# Patient Record
Sex: FEMALE | Race: WHITE | Employment: FULL TIME | URBAN - METROPOLITAN AREA
[De-identification: names, ages, dates, MRNs, and addresses within clinical notes are randomized per-mention and may not be internally consistent; named-entity substitution may affect disease eponyms.]

---

## 2023-01-04 ENCOUNTER — OFFICE VISIT (OUTPATIENT)
Dept: UROGYNECOLOGY | Age: 58
End: 2023-01-04
Payer: COMMERCIAL

## 2023-01-04 VITALS
DIASTOLIC BLOOD PRESSURE: 82 MMHG | HEART RATE: 69 BPM | SYSTOLIC BLOOD PRESSURE: 123 MMHG | TEMPERATURE: 97.8 F | RESPIRATION RATE: 17 BRPM | OXYGEN SATURATION: 97 %

## 2023-01-04 DIAGNOSIS — N39.3 STRESS INCONTINENCE: Primary | ICD-10-CM

## 2023-01-04 DIAGNOSIS — N81.11 CYSTOCELE, MIDLINE: ICD-10-CM

## 2023-01-04 LAB
BILIRUBIN, POC: NORMAL
BLOOD URINE, POC: NORMAL
CLARITY, POC: CLEAR
COLOR, POC: YELLOW
EMPTY COUGH STRESS TEST: NORMAL
FIRST SENSATION: 140 CC
FULL COUGH STRESS TEST: NORMAL
GLUCOSE URINE, POC: NORMAL
KETONES, POC: NORMAL
LEUKOCYTE EST, POC: NORMAL
MAX SENSATION: 180 CC
NITRATE, URINE POC: NORMAL
NITRITE, POC: NORMAL
PH, POC: 6.5
POST VOID RESIDUAL (PVR): 150 ML
PROTEIN, POC: NORMAL
RBC URINE, POC: NORMAL
SECOND SENSATION: 150 CC
SPASM: NORMAL
SPECIFIC GRAVITY, POC: 1.01
UROBILINOGEN, POC: NORMAL
WBC URINE, POC: NORMAL

## 2023-01-04 PROCEDURE — 51725 SIMPLE CYSTOMETROGRAM: CPT | Performed by: OBSTETRICS & GYNECOLOGY

## 2023-01-04 PROCEDURE — 81002 URINALYSIS NONAUTO W/O SCOPE: CPT | Performed by: OBSTETRICS & GYNECOLOGY

## 2023-01-04 PROCEDURE — 99203 OFFICE O/P NEW LOW 30 MIN: CPT | Performed by: OBSTETRICS & GYNECOLOGY

## 2023-01-04 RX ORDER — FINGOLIMOD HYDROCHLORIDE 0.5 MG/1
1 CAPSULE ORAL
COMMUNITY
Start: 2012-02-13

## 2023-01-04 RX ORDER — LOSARTAN POTASSIUM 50 MG/1
TABLET ORAL
COMMUNITY
Start: 2022-08-08

## 2023-01-04 RX ORDER — SOLIFENACIN SUCCINATE 10 MG/1
10 TABLET, FILM COATED ORAL DAILY
Qty: 30 TABLET | Refills: 1 | Status: SHIPPED | OUTPATIENT
Start: 2023-01-04

## 2023-01-04 ASSESSMENT — ENCOUNTER SYMPTOMS: CONSTIPATION: 1

## 2023-01-04 NOTE — PROGRESS NOTES
1/4/2023      HPI:     Name: Gates Burkitt  YOB: 1965    CC: Patient is a 56 y.o. female who is seen in consultation from Tricia Grant MD   for evaluation of stress incontinence . HPI:  Bladder control problem: Yes   How many months have you had a bladder problem? many years  Do you use pads to absorb lost urine? Yes. If yes how many pads do you wear a day? 1   How many trips do you make to the bathroom during the day? 8-9  How many times do you wake at night to go to the bathroom? 1  Do you ever wet the bed while asleep? No  Are there times when you cannot make it to the bathroom on time? Yes  Does sound, sight, or feel of running water cause you to lose urine? No  How many ounces of liquid do you consume daily? 64-80 oz  How many drinks containing caffeine do you consume daily? 1  Which best describes urine loss: (Check all that apply)  [x] I lose urine during coughing, sneezing, running, lifting  [x] I lose urine with changes in posture, standing, walking  [] I lose urine continuously such that I am constantly wet  [x] I have sudden, urgent needs without the ability to make it to the bathroom  Have you seen a physician for complaints of urine loss? No   Have you taken medication to prevent urine loss? No     Bladder emptying problems:  No   Prolapse/Vaginal Support problems: No   Bowel problem(s): Yes   How long have you had bowel symptoms? 10-15 years  Do you have accidental loss of solid stool? No  Do you have accidental loss of liquid stool? Yes  Do you have accidental loss of gas? Yes  How many episodes per week? Do you have constipation? Yes Do you have diarrhea? No Problems with bloating? No  Do you have frequent desire to have a bowel movement? No  Do you feel that your bowels are never completely empty? Yes  Do you ever place your fingers in your vagina or between the vagina and rectum to help with a bowel movement? No  Have you seen a physician for bowel symptoms?  No   Sexual History:  reports being sexually active. Pelvic Pain:  No   Ob/Gyn History:    OB History    Para Term  AB Living   2 2 2         SAB IAB Ectopic Molar Multiple Live Births                    # Outcome Date GA Lbr Shawn/2nd Weight Sex Delivery Anes PTL Lv   2 Term      CS-Unspec      1 Term      Vag-Spont        Past Medical History:   Past Medical History:   Diagnosis Date    Hypertension     Multiple sclerosis ([address]      Past Surgical History: History reviewed. No pertinent surgical history. Allergies: Allergies   Allergen Reactions    Lisinopril Cough     Current Medications:  Current Outpatient Medications   Medication Sig Dispense Refill    Fingolimod HCl 0.5 MG CAPS Take 1 capsule by mouth      losartan (COZAAR) 50 MG tablet TAKE ONE TABLET BY MOUTH DAILY      Cholecalciferol 250 MCG (29069 UT) TABS Take 10,000 Units by mouth daily      solifenacin (VESICARE) 10 MG tablet Take 1 tablet by mouth daily 30 tablet 1     No current facility-administered medications for this visit.      Social History:   Social History     Socioeconomic History    Marital status:      Spouse name: Not on file    Number of children: Not on file    Years of education: Not on file    Highest education level: Not on file   Occupational History    Not on file   Tobacco Use    Smoking status: Some Days     Types: Cigarettes    Smokeless tobacco: Never   Substance and Sexual Activity    Alcohol use: Yes     Comment: occasionally    Drug use: Never    Sexual activity: Yes   Other Topics Concern    Not on file   Social History Narrative    Not on file     Social Determinants of Health     Financial Resource Strain: Not on file   Food Insecurity: Not on file   Transportation Needs: Not on file   Physical Activity: Not on file   Stress: Not on file   Social Connections: Not on file   Intimate Partner Violence: Not on file   Housing Stability: Not on file     Family History:   Family History   Problem Relation Age of Onset    Thyroid Disease Mother     Stroke Mother     Heart Disease Mother     Diabetes Father     Hypertension Father     Heart Disease Father     Cancer Sister      Review of Systems:  Review of Systems   Gastrointestinal:  Positive for constipation. Genitourinary:  Positive for frequency. All other systems reviewed and are negative. Objective:     Vital Signs  Vitals:    01/04/23 1230   BP: 123/82   Pulse: 69   Resp: 17   Temp: 97.8 °F (36.6 °C)   SpO2: 97%     Physical Exam  Physical Exam        Office Fill Study/Urine Dip:     Using sterile technique a manometry catheter was placed. Patient's bladder was filled with sterile water by gravity. Capacity and storage volumes were measured. Spasms assessed. Catheter was removed. Stress urinary incontinence was assessed.     Results for POC orders placed in visit on 01/04/23   POCT Urinalysis no Micro   Result Value Ref Range    Color, UA yellow     Clarity, UA clear     Glucose, UA POC neg     Bilirubin, UA neg     Ketones, UA neg     Spec Grav, UA 1.015     Blood, UA POC neg     pH, UA 6.5     Protein, UA POC neg     Urobilinogen, UA neg     Leukocytes, UA pos, small     Nitrite, UA neg        Cystometrogram   WBC Urine, POC   Date Value Ref Range Status   01/04/2023 pos, small  Final     RBC Urine, POC   Date Value Ref Range Status   01/04/2023 neg  Final     Nitrate, UA POC   Date Value Ref Range Status   01/04/2023 neg  Final     post void residual   Date Value Ref Range Status   01/04/2023 150 ml Final     FIRST SENSATION   Date Value Ref Range Status   01/04/2023 140 cc Final     SECOND SENSATION   Date Value Ref Range Status   01/04/2023 150 cc Final     MAX SENSATION   Date Value Ref Range Status   01/04/2023 180 cc Final     EMPTY COUGH STRESS TEST   Date Value Ref Range Status   01/04/2023 neg  Final     FULL COUGH STRESS TEST   Date Value Ref Range Status   01/04/2023 neg  Final     SPASM   Date Value Ref Range Status   01/04/2023 neg  Final POPQ and Pelvic Exam:     Pelvic Organ Prolapse Quantification  Anterior Wall (Aa): -2 Anterior Wall (Ba): -2   Cervix or Cuff (C): -6     Genital Hiatus (gh): 3 Perineal Body (pb): 3   Total Vaginal Length (tvl): 8     Posterior Wall (Ap): -2 Posterior Wall (Bp): -2   Posterior Fornix (D): -6     No data recorded     Assessment/Plan:     Go Dotson is a 56 y.o. female with   1. Stress incontinence    2. Cystocele, midline    Old records reviewed, outside records reviewed, cystometrogram was reviewed    Cecil Garcia presents today with a chief complaint of urinary incontinence. By her history she describes primarily stress urinary incontinence however she had a slightly reduced bladder capacity today and we were unable to demonstrate the sign of stress incontinence. Because of this I asked her to try Vesicare for short period of time but I am also going to have her follow-up for urodynamics and cystourethroscopy first suspected suburethral sling. She was given handouts on stress incontinence. She has an asymptomatic cystocele.   She is related to Odalys Nino, a nurse who worked at [address] [place] [address] This Encounter   Procedures    Ambulatory referral to Physical Therapy     Referral Priority:   Routine     Referral Type:   Eval and Treat     Referral Reason:   Specialty Services Required     Referred to Provider:   Cortez Pisano PT     Requested Specialty:   Physical Therapist     Number of Visits Requested:   1    POCT Urinalysis no Micro    Cystometrogram       Orders Placed This Encounter   Medications    solifenacin (VESICARE) 10 MG tablet     Sig: Take 1 tablet by mouth daily     Dispense:  30 tablet     Refill:  1         Sania Brumfield MD